# Patient Record
(demographics unavailable — no encounter records)

---

## 2025-01-28 NOTE — REASON FOR VISIT
[Initial Consultation] : an initial consultation for [Mother] : mother [Ad Hoc ] : provided by an ad hoc  [Interpreters_IDNumber] : [id number]

## 2025-01-28 NOTE — PHYSICAL EXAM
[Exposed Vessel] : left anterior vessel not exposed [Clear to Auscultation] : lungs were clear to auscultation bilaterally [Wheezing] : no wheezing [Increased Work of Breathing] : no increased work of breathing with use of accessory muscles and retractions [Normal muscle strength, symmetry and tone of facial, head and neck musculature] : normal muscle strength, symmetry and tone of facial, head and neck musculature [Normal] : no cervical lymphadenopathy [de-identified] : ramator, upper airway transmitted sounds  Nostril Rim Text: The closure involved the nostril rim.

## 2025-01-28 NOTE — ASSESSMENT
[FreeTextEntry1] : 3 month female with noisy breathing.  No laryngomalacia on scope.   Discussed that it is very common to have nasal congestion at this age. Minimal adenoids on scope exam so no real need for intervention there.  Discussed that often lots of nasal saline and judicious suctioning can improve it and that often it gets better with time. Can consider a nasal steroid if worsens or does not improve with conservative therapy but often don't use in children this age. Discussed role that diet and reflux can play in nasal congestion in this age group and sometimes evaluation for diet elimination and nutrition consults is warranted.  Consider CMPE.  RTC 2-3 months

## 2025-03-18 NOTE — PHYSICAL EXAM
[Exposed Vessel] : left anterior vessel not exposed [Clear to Auscultation] : lungs were clear to auscultation bilaterally [Wheezing] : no wheezing [Increased Work of Breathing] : no increased work of breathing with use of accessory muscles and retractions [Normal muscle strength, symmetry and tone of facial, head and neck musculature] : normal muscle strength, symmetry and tone of facial, head and neck musculature [Normal] : no cervical lymphadenopathy [de-identified] : ramator, upper airway transmitted sounds

## 2025-03-18 NOTE — REASON FOR VISIT
[Subsequent Evaluation] : a subsequent evaluation for [Mother] : mother [[employer] ] : provided by [employer]   [Interpreters_IDNumber] : [id number]

## 2025-03-18 NOTE — ASSESSMENT
[FreeTextEntry1] : 4 month female with noisy breathing.  No laryngomalacia on scope. Scope again today looks good. Lots of secretions in the nose. Gaining weight and doing well.  Discussed that it is very common to have nasal congestion at this age. Minimal adenoids on scope exam so no real need for intervention there.  Discussed that often lots of nasal saline and judicious suctioning can improve it and that often it gets better with time. Can consider a nasal steroid if worsens or does not improve with conservative therapy but often don't use in children this age. Discussed role that diet and reflux can play in nasal congestion in this age group and sometimes evaluation for diet elimination and nutrition consults is warranted.  Cont CMPE.  RTC 3-4 months

## 2025-03-18 NOTE — HISTORY OF PRESENT ILLNESS
[de-identified] : 3-18-25 Vomiting has gotten better. still with nasal congestion. Using saline in the nose.  no apneas or pauses. gaining weight well tried CMPE and vomiting got better.  1-28-25 3 month female here for evaluation of noisy breathing.  Noise described as high pitched whistling/wheezing Worse with feeding and sleeping Occasional coughing/choking/gagging with feeds No lung infections or RAD symptoms Gaining weight well Patient has occasional snoring.  concerns for pauses mostly during feeding when very congested.  No BRUEs/ALTEs No previous head and neck surgeries. on formula.  No hearing or speech concerns Patient has not had a sleep study Normal birth history, no oxygen or intubation or ICU stays at birth Passed [facility]

## 2025-06-24 NOTE — REASON FOR VISIT
[Subsequent Evaluation] : a subsequent evaluation for [Parents] : parents [Language [employer] ] : provided by [employer]   [Interpreters_IDNumber] : [id number]

## 2025-06-24 NOTE — PHYSICAL EXAM
[Normal muscle strength, symmetry and tone of facial, head and neck musculature] : normal muscle strength, symmetry and tone of facial, head and neck musculature [Normal] : no cervical lymphadenopathy [Exposed Vessel] : left anterior vessel not exposed [Wheezing] : no wheezing [Increased Work of Breathing] : no increased work of breathing with use of accessory muscles and retractions [de-identified] : wet cough

## 2025-06-24 NOTE — HISTORY OF PRESENT ILLNESS
[de-identified] : 6-24-25 still having some chronic nasal congestion and chronic recurrent cough.  mostly with illnesses gaining weight had CXR at hospital and was normal never seen pulm  3-18-25 Vomiting has gotten better. still with nasal congestion. Using saline in the nose.  no apneas or pauses. gaining weight well tried CMPE and vomiting got better.   1-28-25 3 month female here for evaluation of noisy breathing.  Noise described as high pitched whistling/wheezing Worse with feeding and sleeping Occasional coughing/choking/gagging with feeds No lung infections or RAD symptoms Gaining weight well Patient has occasional snoring.  concerns for pauses mostly during feeding when very congested.  No BRUEs/ALTEs No previous head and neck surgeries. on formula.  No hearing or speech concerns Patient has not had a sleep study Normal birth history, no oxygen or intubation or ICU stays at birth Passed [facility]

## 2025-06-24 NOTE — ASSESSMENT
[FreeTextEntry1] : 7 month female with noisy breathing now with chronic congestion and chronic cough.  No laryngomalacia on scope. Gaining weight and doing well.  Discussed that it is very common to have nasal congestion at this age. Minimal adenoids on scope exam so no real need for intervention there.  Discussed that often lots of nasal saline and judicious suctioning can improve it and that often it gets better with time. Can consider a nasal steroid if worsens or does not improve with conservative therapy but often don't use in children this age. Discussed role that diet and reflux can play in nasal congestion in this age group and sometimes evaluation for diet elimination and nutrition consults is warranted.  Discussed that chronic cough can be related to any number of things. Commonly it is post-viral and can last up to several weeks to months following the viral infection. Mostly supportive care in this setting. Can be related to post-nasal drip and nasal allergies. In this setting we usually try OTC allergy meds and nasal saline. Consider allergy referral.  Can be related to GERD/LPR. In this setting we usually try diet modification and consider treatment with anti-reflux meds and possible GI referral. Can be related to FB in certain age groups and sometimes we recommend imaging and possible DLB in that scenario.  Can also be related to cough variant asthma. In that scenario we usually recommend pulm referral and workup.  Cont CMPE.  RTC 3-4 months  A total of 30 minutes was spent on the encounter excluding separately billable services including but not limited to direct patient care, review and independent interpretation of prior records and testing, documentation, counseling patient/family, and coordination of care.